# Patient Record
Sex: FEMALE | Race: WHITE | HISPANIC OR LATINO | URBAN - METROPOLITAN AREA
[De-identification: names, ages, dates, MRNs, and addresses within clinical notes are randomized per-mention and may not be internally consistent; named-entity substitution may affect disease eponyms.]

---

## 2020-08-10 ENCOUNTER — APPOINTMENT (RX ONLY)
Dept: URBAN - METROPOLITAN AREA CLINIC 15 | Facility: CLINIC | Age: 35
Setting detail: DERMATOLOGY
End: 2020-08-10

## 2020-08-10 DIAGNOSIS — Z41.9 ENCOUNTER FOR PROCEDURE FOR PURPOSES OTHER THAN REMEDYING HEALTH STATE, UNSPECIFIED: ICD-10-CM

## 2020-08-10 PROBLEM — Z92.89 PERSONAL HISTORY OF OTHER MEDICAL TREATMENT: Status: ACTIVE | Noted: 2020-08-10

## 2020-08-10 PROCEDURE — ? MEDICAL CONSULTATION: FILLERS

## 2020-08-10 PROCEDURE — ? TELEHEALTH ASSESSMENT

## 2020-08-10 NOTE — PROCEDURE: TELEHEALTH ASSESSMENT

## 2020-09-12 ENCOUNTER — APPOINTMENT (RX ONLY)
Dept: URBAN - METROPOLITAN AREA CLINIC 15 | Facility: CLINIC | Age: 35
Setting detail: DERMATOLOGY
End: 2020-09-12

## 2020-09-12 DIAGNOSIS — Z41.9 ENCOUNTER FOR PROCEDURE FOR PURPOSES OTHER THAN REMEDYING HEALTH STATE, UNSPECIFIED: ICD-10-CM

## 2020-09-12 PROCEDURE — ? FILLERS

## 2020-09-12 PROCEDURE — ? BOTOX

## 2020-09-12 ASSESSMENT — LOCATION DETAILED DESCRIPTION DERM
LOCATION DETAILED: RIGHT SUPERIOR VERMILION LIP
LOCATION DETAILED: LEFT CENTRAL MALAR CHEEK
LOCATION DETAILED: RIGHT INFERIOR FOREHEAD
LOCATION DETAILED: LEFT INFERIOR VERMILION LIP
LOCATION DETAILED: RIGHT SUPERIOR FOREHEAD
LOCATION DETAILED: LEFT INFERIOR FOREHEAD
LOCATION DETAILED: LEFT INFERIOR TEMPLE
LOCATION DETAILED: LEFT FOREHEAD
LOCATION DETAILED: LEFT LATERAL FOREHEAD
LOCATION DETAILED: LEFT SUPERIOR FOREHEAD
LOCATION DETAILED: GLABELLA
LOCATION DETAILED: RIGHT INFERIOR VERMILION LIP
LOCATION DETAILED: RIGHT LATERAL FOREHEAD
LOCATION DETAILED: RIGHT FOREHEAD
LOCATION DETAILED: RIGHT CENTRAL MALAR CHEEK
LOCATION DETAILED: LEFT SUPERIOR VERMILION LIP
LOCATION DETAILED: RIGHT INFERIOR TEMPLE

## 2020-09-12 ASSESSMENT — LOCATION SIMPLE DESCRIPTION DERM
LOCATION SIMPLE: RIGHT FOREHEAD
LOCATION SIMPLE: RIGHT TEMPLE
LOCATION SIMPLE: RIGHT CHEEK
LOCATION SIMPLE: LEFT TEMPLE
LOCATION SIMPLE: LEFT LIP
LOCATION SIMPLE: GLABELLA
LOCATION SIMPLE: LEFT CHEEK
LOCATION SIMPLE: LEFT FOREHEAD
LOCATION SIMPLE: RIGHT LIP

## 2020-09-12 ASSESSMENT — LOCATION ZONE DERM
LOCATION ZONE: LIP
LOCATION ZONE: FACE

## 2020-09-12 NOTE — HPI: COSMETIC PROCEDURE
Additional History: Patient wants to have her lips done, she has done it in the past. Patient is aware of pricing for Restylane Kysse

## 2020-09-12 NOTE — PROCEDURE: FILLERS
Additional Area 4 Volume In Cc: 0
Additional Area 5 Location: left tear through
Expiration Date (Month Year): 2021-09-27
Post-Care Instructions: Patient instructed to apply ice to reduce swelling and return with any issues.
Vermilion Lips Filler Volume In Cc: 1
Price (Use Numbers Only, No Special Characters Or $): [address] [place] [address]
Include Cannula Information In Note?: No
Additional Area 2 Location: Left Nasolabial folds
Lot #: [id number]
Use Map Statement For Sites (Optional): Yes
Additional Area 3 Location: chin
Map Statment: See [address] for Complete Details
Expiration Date (Month Year): 04/30/2022
Additional Area 1 Location: lip
Additional Area 2 Location: nose
Additional Area 1 Location: lips
Include Cannula Brand?: DermaSculpt
Lot #: [id number]
Include Cannula Size?: 25G
Expiration Date (Month Year): 2022-05-31
Filler: Restylane Kysse
Additional Area 3 Location: corner of the mouth
Include Cannula Length?: 1.5 inch
Detail Level: Zone
Consent: Written consent obtained. Risks include but not limited to bruising, beading, irregular texture, ulceration, infection, allergic reaction, scar formation, incomplete augmentation, temporary nature, procedural pain.
Lot #: [id number]
Filler: Radiesse
Price (Use Numbers Only, No Special Characters Or $): 756
Lot #: [id number]
Lot #: [id number]
Expiration Date (Month Year): 08/05/2020
Map Statment: See Attach Map for Complete Details
Lot #: [id number]

## 2020-09-12 NOTE — HPI: COSMETIC (BOTOX)
Have You Had Botox Before?: has had botox
Additional History: Patient is aware of Botox pricing for all the areas
When Was Your Last Botox Treatment?: 2019

## 2020-09-12 NOTE — PROCEDURE: BOTOX
Additional Area 4 Location: 0
Show Additional Area 1: Yes
Post-Care Instructions: Patient instructed to not lie down for 4 hours and limit physical activity for 24 hours. Patient instructed not to travel by airplane for 48 hours.
Lot #: [id number]
Additional Area 1 Location: chin
Dilution (U/0.1 Cc): 2
Glabellar Complex Units: [id number]
Forehead Units: 20
Show Ucl Units: No
Detail Level: Zone
Expiration Date (Month Year): 04/2023
Price (Use Numbers Only, No Special Characters Or $): [address]
Consent: Written consent obtained. Risks include but not limited to lid/brow ptosis, bruising, swelling, diplopia, temporary effect, incomplete chemical denervation.

## 2020-09-24 ENCOUNTER — APPOINTMENT (RX ONLY)
Dept: URBAN - METROPOLITAN AREA CLINIC 15 | Facility: CLINIC | Age: 35
Setting detail: DERMATOLOGY
End: 2020-09-24

## 2020-09-24 DIAGNOSIS — Z41.9 ENCOUNTER FOR PROCEDURE FOR PURPOSES OTHER THAN REMEDYING HEALTH STATE, UNSPECIFIED: ICD-10-CM

## 2020-09-24 PROCEDURE — ? ADDITIONAL NOTES

## 2020-09-24 PROCEDURE — 99212 OFFICE O/P EST SF 10 MIN: CPT | Mod: 95

## 2020-09-24 NOTE — PROCEDURE: ADDITIONAL NOTES
Detail Level: Zone
Additional Notes: . \\nPatient will come on October 10 for a cosmetic follow up because she is on [place].  \\nShe will need a Botox touch and she has some bumps on the upper and  lower lip

## 2020-10-10 ENCOUNTER — APPOINTMENT (RX ONLY)
Dept: URBAN - METROPOLITAN AREA CLINIC 15 | Facility: CLINIC | Age: 35
Setting detail: DERMATOLOGY
End: 2020-10-10

## 2020-10-10 DIAGNOSIS — Z41.9 ENCOUNTER FOR PROCEDURE FOR PURPOSES OTHER THAN REMEDYING HEALTH STATE, UNSPECIFIED: ICD-10-CM

## 2020-10-10 PROCEDURE — ? DYSPORT

## 2020-10-10 PROCEDURE — ? HYALURONIDASE INJECTION

## 2020-10-10 ASSESSMENT — LOCATION DETAILED DESCRIPTION DERM
LOCATION DETAILED: RIGHT SUPERIOR VERMILION LIP
LOCATION DETAILED: RIGHT INFERIOR VERMILION LIP

## 2020-10-10 ASSESSMENT — LOCATION ZONE DERM: LOCATION ZONE: LIP

## 2020-10-10 ASSESSMENT — LOCATION SIMPLE DESCRIPTION DERM: LOCATION SIMPLE: RIGHT LIP

## 2020-10-10 NOTE — PROCEDURE: DYSPORT
Mentalis Units: 0
Additional Comments: touch up no charge
Show [place] Units: Yes
Show Ucl Units: No
Detail Level: Zone
Additional Area 2 Location: face
Expiration Date (Month Year): 03/31/2021
Lot #: [id number]
Consent: Written consent obtained. Risks include but not limited to lid/brow ptosis, bruising, swelling, diplopia, temporary effect, incomplete chemical denervation.
Post-Care Instructions: Patient instructed to not lie down for 4 hours and limit physical activity for 24 hours. Patient instructed not to travel by airplane for 48 hours.
Glabellar Complex Units: 25
Additional Area 1 Location: chin
Dilution (U/ 0.1cc): 3

## 2020-10-10 NOTE — PROCEDURE: HYALURONIDASE INJECTION
Filler Previously Used (Optional): hyauluronic 
Consent: The risks of contour defects and dimpling of the skin were reviewed with the patient prior to the injection.
Total Volume (Ccs): 3
Expiration Date (Optional): 07/2021. Sharif [place] [id number]
Hyaluronidase Preparation: hyaluronidase
Lot # (Optional): [id number]
Administered By (Optional): [address] [place] [address] Street
Total Units (Leave Blank If Undesired): 0.2
Detail Level: Zone
Price (Use Numbers Only, No Special Characters Or $): 0